# Patient Record
Sex: FEMALE | Race: WHITE | HISPANIC OR LATINO | Employment: FULL TIME | URBAN - METROPOLITAN AREA
[De-identification: names, ages, dates, MRNs, and addresses within clinical notes are randomized per-mention and may not be internally consistent; named-entity substitution may affect disease eponyms.]

---

## 2017-06-15 ENCOUNTER — HOSPITAL ENCOUNTER (OUTPATIENT)
Dept: LAB | Facility: MEDICAL CENTER | Age: 37
End: 2017-06-15
Attending: FAMILY MEDICINE

## 2017-06-15 LAB
ALBUMIN SERPL BCP-MCNC: 4.4 G/DL (ref 3.2–4.9)
ALBUMIN/GLOB SERPL: 1.2 G/DL
ALP SERPL-CCNC: 62 U/L (ref 30–99)
ALT SERPL-CCNC: 45 U/L (ref 2–50)
ANION GAP SERPL CALC-SCNC: 3 MMOL/L (ref 0–11.9)
AST SERPL-CCNC: 29 U/L (ref 12–45)
BILIRUB SERPL-MCNC: 0.6 MG/DL (ref 0.1–1.5)
BUN SERPL-MCNC: 13 MG/DL (ref 8–22)
CALCIUM SERPL-MCNC: 9.5 MG/DL (ref 8.5–10.5)
CHLORIDE SERPL-SCNC: 103 MMOL/L (ref 96–112)
CHOLEST SERPL-MCNC: 212 MG/DL (ref 100–199)
CO2 SERPL-SCNC: 33 MMOL/L (ref 20–33)
CREAT SERPL-MCNC: 0.68 MG/DL (ref 0.5–1.4)
EST. AVERAGE GLUCOSE BLD GHB EST-MCNC: 108 MG/DL
GFR SERPL CREATININE-BSD FRML MDRD: >60 ML/MIN/1.73 M 2
GLOBULIN SER CALC-MCNC: 3.8 G/DL (ref 1.9–3.5)
GLUCOSE SERPL-MCNC: 84 MG/DL (ref 65–99)
HBA1C MFR BLD: 5.4 % (ref 0–5.6)
HDLC SERPL-MCNC: 67 MG/DL
LDLC SERPL CALC-MCNC: 128 MG/DL
POTASSIUM SERPL-SCNC: 3.4 MMOL/L (ref 3.6–5.5)
PROT SERPL-MCNC: 8.2 G/DL (ref 6–8.2)
SODIUM SERPL-SCNC: 139 MMOL/L (ref 135–145)
TRIGL SERPL-MCNC: 87 MG/DL (ref 0–149)

## 2017-06-15 PROCEDURE — 80061 LIPID PANEL: CPT

## 2017-06-15 PROCEDURE — 36415 COLL VENOUS BLD VENIPUNCTURE: CPT

## 2017-06-15 PROCEDURE — 80053 COMPREHEN METABOLIC PANEL: CPT

## 2017-06-15 PROCEDURE — 83036 HEMOGLOBIN GLYCOSYLATED A1C: CPT

## 2017-08-29 ENCOUNTER — HOSPITAL ENCOUNTER (EMERGENCY)
Facility: MEDICAL CENTER | Age: 37
End: 2017-08-29
Attending: EMERGENCY MEDICINE

## 2017-08-29 VITALS
HEART RATE: 69 BPM | WEIGHT: 191 LBS | RESPIRATION RATE: 19 BRPM | DIASTOLIC BLOOD PRESSURE: 61 MMHG | TEMPERATURE: 97.6 F | SYSTOLIC BLOOD PRESSURE: 100 MMHG | HEIGHT: 64 IN | BODY MASS INDEX: 32.61 KG/M2 | OXYGEN SATURATION: 99 %

## 2017-08-29 DIAGNOSIS — R55 SYNCOPE, UNSPECIFIED SYNCOPE TYPE: ICD-10-CM

## 2017-08-29 LAB
ANION GAP SERPL CALC-SCNC: 10 MMOL/L (ref 0–11.9)
BASOPHILS # BLD AUTO: 0.4 % (ref 0–1.8)
BASOPHILS # BLD: 0.03 K/UL (ref 0–0.12)
BUN SERPL-MCNC: 10 MG/DL (ref 8–22)
CALCIUM SERPL-MCNC: 8.8 MG/DL (ref 8.5–10.5)
CHLORIDE SERPL-SCNC: 107 MMOL/L (ref 96–112)
CO2 SERPL-SCNC: 22 MMOL/L (ref 20–33)
CREAT SERPL-MCNC: 0.58 MG/DL (ref 0.5–1.4)
EOSINOPHIL # BLD AUTO: 0.07 K/UL (ref 0–0.51)
EOSINOPHIL NFR BLD: 1 % (ref 0–6.9)
ERYTHROCYTE [DISTWIDTH] IN BLOOD BY AUTOMATED COUNT: 47.8 FL (ref 35.9–50)
GFR SERPL CREATININE-BSD FRML MDRD: >60 ML/MIN/1.73 M 2
GLUCOSE SERPL-MCNC: 101 MG/DL (ref 65–99)
HCG SERPL QL: NEGATIVE
HCT VFR BLD AUTO: 35.7 % (ref 37–47)
HGB BLD-MCNC: 11.3 G/DL (ref 12–16)
IMM GRANULOCYTES # BLD AUTO: 0.03 K/UL (ref 0–0.11)
IMM GRANULOCYTES NFR BLD AUTO: 0.4 % (ref 0–0.9)
LYMPHOCYTES # BLD AUTO: 2.13 K/UL (ref 1–4.8)
LYMPHOCYTES NFR BLD: 28.9 % (ref 22–41)
MCH RBC QN AUTO: 26.1 PG (ref 27–33)
MCHC RBC AUTO-ENTMCNC: 31.7 G/DL (ref 33.6–35)
MCV RBC AUTO: 82.4 FL (ref 81.4–97.8)
MONOCYTES # BLD AUTO: 0.37 K/UL (ref 0–0.85)
MONOCYTES NFR BLD AUTO: 5 % (ref 0–13.4)
NEUTROPHILS # BLD AUTO: 4.73 K/UL (ref 2–7.15)
NEUTROPHILS NFR BLD: 64.3 % (ref 44–72)
NRBC # BLD AUTO: 0 K/UL
NRBC BLD AUTO-RTO: 0 /100 WBC
PLATELET # BLD AUTO: 269 K/UL (ref 164–446)
PMV BLD AUTO: 10.7 FL (ref 9–12.9)
POTASSIUM SERPL-SCNC: 3.4 MMOL/L (ref 3.6–5.5)
RBC # BLD AUTO: 4.33 M/UL (ref 4.2–5.4)
SODIUM SERPL-SCNC: 139 MMOL/L (ref 135–145)
TROPONIN I SERPL-MCNC: <0.01 NG/ML (ref 0–0.04)
WBC # BLD AUTO: 7.4 K/UL (ref 4.8–10.8)

## 2017-08-29 PROCEDURE — 85025 COMPLETE CBC W/AUTO DIFF WBC: CPT

## 2017-08-29 PROCEDURE — 84703 CHORIONIC GONADOTROPIN ASSAY: CPT

## 2017-08-29 PROCEDURE — 93005 ELECTROCARDIOGRAM TRACING: CPT | Performed by: EMERGENCY MEDICINE

## 2017-08-29 PROCEDURE — 700105 HCHG RX REV CODE 258: Performed by: EMERGENCY MEDICINE

## 2017-08-29 PROCEDURE — 700111 HCHG RX REV CODE 636 W/ 250 OVERRIDE (IP): Performed by: EMERGENCY MEDICINE

## 2017-08-29 PROCEDURE — 36415 COLL VENOUS BLD VENIPUNCTURE: CPT

## 2017-08-29 PROCEDURE — 84484 ASSAY OF TROPONIN QUANT: CPT

## 2017-08-29 PROCEDURE — 80048 BASIC METABOLIC PNL TOTAL CA: CPT

## 2017-08-29 PROCEDURE — 99285 EMERGENCY DEPT VISIT HI MDM: CPT

## 2017-08-29 PROCEDURE — 96374 THER/PROPH/DIAG INJ IV PUSH: CPT

## 2017-08-29 RX ORDER — SODIUM CHLORIDE 9 MG/ML
1000 INJECTION, SOLUTION INTRAVENOUS ONCE
Status: COMPLETED | OUTPATIENT
Start: 2017-08-29 | End: 2017-08-29

## 2017-08-29 RX ORDER — LORAZEPAM 2 MG/ML
1 INJECTION INTRAMUSCULAR ONCE
Status: COMPLETED | OUTPATIENT
Start: 2017-08-29 | End: 2017-08-29

## 2017-08-29 RX ADMIN — LORAZEPAM 1 MG: 2 INJECTION INTRAMUSCULAR; INTRAVENOUS at 05:44

## 2017-08-29 RX ADMIN — SODIUM CHLORIDE 1000 ML: 9 INJECTION, SOLUTION INTRAVENOUS at 04:58

## 2017-08-29 ASSESSMENT — PAIN SCALES - GENERAL: PAINLEVEL_OUTOF10: 2

## 2017-08-29 NOTE — DISCHARGE INSTRUCTIONS
Síncope  (Syncope)  Síncope es un término médico que significa desmayarse o perder la conciencia. Es cuando se pierde la conciencia y  al piso. Generalmente, la persona permanece inconsciente ashanti menos de 5 minutos. Puede tener algunas contracciones musculares ashanti un van de 15 segundos antes de despertar y volver a la normalidad. El síncope se presenta con mayor frecuencia en los adultos mayores, david puede ocurrir a cualquier edad. Aunque la mayoría de las causas no implican un peligro, el síncope puede ser un signo de un problema médico grave. Es importante buscar atención médica.   CAUSAS   La causa es alesia disminución súbita del flujo de maribel al cerebro. Generalmente la causa específica no puede determinarse. Los factores que pueden provocar el síncope son:  · El uso de medicamentos que disminuyen la presión arterial.  · Los cambios súbitos de postura, kaitlin por ejemplo al ponerse de pie rápidamente.  · Neelima más dosis de medicamento que lo recetado.  · Permanecer de pie en un lugar por mucho tiempo.  · Sufrir convulsiones.  · Deshidratación y exposición excesiva al calor.  · Bajo nivel de glucosa en maribel (hipoglucemia).  · Dificultad para defecar.  · Enfermedades cardíacas, latidos cardíacos irregulares u otros problemas circulatorios.  · Miedos, estrés emocional, visión de maribel o dolor intenso.  SÍNTOMAS   Inmediatamente antes de desmayarse podrá:  · Sentirse mareado o aturdido.  · Sentir náuseas.  · [place] todo horne o hugh en el yosef de la visión.  · Tener la piel fría y húmeda.  DIAGNÓSTICO   El médico le preguntará acerca de lanette síntomas, le realizará un examen físico y un electrocardiograma (ECG) para registrar la actividad eléctrica del corazón. También podrá indicarle otras pruebas cardíacas o análisis de maribel para determinar las causas del síncope, por ejemplo:  · Ecocardiograma transtorácico (ETT). Ashanti el ecocardiograma, se usan ondas sonoras para evaluar el flujo de la maribel  a través del corazón.  · Ecocardiograma transesofágico (ETE).  · Monitoreo cardíaco. Permite que el médico controle la frecuencia y el ritmo cardíaco en tiempo real.  · Monitor Holter. Es un dispositivo portátil que registra los latidos cardíacos y ayuda a diagnosticar las arritmias cardíacas. Le permite al médico registrar la actividad cardíaca ashanti varios días, si es necesario.  · Pruebas de estrés por ejercicio o por medicamentos que aceleran los latidos cardíacos.  TRATAMIENTO   En la mayoría de los casos, no se necesita tratamiento. Según la causa del síncope, el médico podrá recomendarle que cambie o deje de annia algunos de lanette medicamentos.  INSTRUCCIONES PARA EL CUIDADO EN EL HOGAR  · Pídale a alguien que se quede con usted hasta que se sienta estable.  · No conduzca vehículos, no use maquinarias ni practique deportes hasta que el médico lo autorice.  · Cumpla con todas las visitas de control, según le indique young médico.  · Recuéstese inmediatamente si siente que va a desmayarse. Respire profundamente y de manera continua. Espere hasta que los síntomas hayan desaparecido.  · Sasha suficiente líquido para mantener la orina keaton o de color amarillo pálido.  · Si está tomando medicamentos para la presión arterial o para el corazón, póngase de pie lentamente, tómese algunos minutos para permanecer sentado y luego párese. [place] reduce los mareos.  SOLICITE ATENCIÓN MÉDICA DE INMEDIATO SI:   · Sufre un dolor intenso de glenda.  · Siente un dolor intenso inusual en el pecho, el abdomen, o la espalda.  · Tiene un sangrado por la boca o el recto, o la materia fecal es de color hugh o aspecto alquitranado.  · Siente latidos irregulares o muy rápidos.  · Siente dolor al respirar.  · Sufre episodios de desmayo repetidos o temblores kaitlin sacudidas ashanti un episodio.  · Se desmaya mientras se encuentra sentado o acostado.  · Se siente confundido.  · Presenta dificultad para caminar.  · Siente debilidad  intensa.  · Tiene problemas de visión.  Si se desmaya, llame al servicio de emergencias de young localidad (911 en los [place]). No conduzca por lanette propios medios hasta el hospital.      Esta información no tiene kaitlin fin reemplazar el consejo del médico. Asegúrese de hacerle al médico cualquier pregunta que tenga.     Document Released: 09/27/2006 Document Revised: 05/03/2016  [employer] Interactive Patient Education ©2016 Elsevier Inc.    (Vasovagal Syncope, Adult)  El síncope, comúnmente conocido kaitlin desmayo, es alesia pérdida transitoria de la conciencia. Se produce cuando se reduce el flujo sanguíneo al cerebro. El síncope vasovagal (también llamado síncope neurocardiogénico) es un desmayo en el que el flujo de maribel al cerebro se reduce a causa de alesia caída repentina en la frecuencia cardíaca y la presión arterial. El síncope vasovagal se produce cuando el cerebro y el sistema cardiovascular (vasos sanguíneos) no se comunican ni responden el ruiz al otro de manera adecuada. Esta es la causa más común de desmayos. A menudo se produce kaitlin respuesta al miedo o algún otro tipo de estrés emocional o físico. El cuerpo tiene alesia reacción en la que el corazón comienza a latir muy lentamente o los vasos sanguíneos se expanden, para reducir la presión arterial. Isabelle tipo de desmayo se considera generalmente inofensivo. Sin embargo, pueden ocurrir lesiones si la persona tiene alesia caída repentina ashanti el desmayo.   CAUSAS   El síncope vasovagal se produce cuando la presión arterial y la frecuencia cardíaca de alesia persona disminuyen de repente, por lo general en respuesta a un factor desencadenante. Muchas cosas y situaciones pueden desencadenar un episodio. Entre ellas se incluyen:   · Dolor.    · Temor.    · [place] maribel o procedimientos médicos, kaitlin la maribel que se extrae de alesia vena.    · Las actividades comunes, tales kaitlin toser, estirarse, o ir al baño.    · Estrés emocional.    · Permanecer mucho tiempo de pie,  especialmente en un ambiente cálido.    · La falta de sueño o de descanso.    · Prolongada falta de alimentos.    · Prolongada falta de líquidos.    · Enfermedad reciente.  · El uso de ciertas drogas que afectan la presión arterial, kaitlin la cocaína, el alcohol, la marihuana, los inhalantes y los opiáceos.    SÍNTOMAS   Antes del episodio de desmayo, es posible que:   · Se sienta mareado o débil.    · Se vuelve pálido.  · Sienta que se va a desmayar.    · Siente kaitlin si la habitación diera vueltas.    · Tiene alesia visión de túnel, sólo ve lo que hay directamente frente a usted.    · Siente malestar estomacal (náuseas).    · Ve manchas o pierde poco a poco la visión.    · Escucha un zumbido en los oídos.    · Tiene dolor de glenda.  · Se siente afiebrado y sudoroso.    · Tiene sensación de hinchazón u hormigueo.  Ashanti el desmayo, por lo general, estará inconsciente ashanti no más de un par de minutos antes de despertar y volver a la normalidad. Si usted se levanta demasiado rápido antes de que young cuerpo pueda recuperarse, se va a desmayar otra vez. Ashanti el desmayo pueden producirse movimientos espasmódicos o bruscos.   DIAGNÓSTICO   El médico le preguntará sobre los síntomas y le hará alesia historia clínica y un examen físico. Se pueden hacer varios estudios para descartar otras causas de los desmayos. Estos pueden incluir análisis de maribel y pruebas para revisar el corazón, kaitlin un electrocardiograma ecocardiografía, y posiblemente, un estudio de electrofisiología. Cuando otras causas salomon sido descartadas, se puede realizar un examen para comprobar la respuesta del organismo a los cambios de posición (prueba de la sharif basculante).   TRATAMIENTO   La mayoría de los casos de síncope vasovagal no requieren tratamiento. Young médico le puede recomendar maneras de evitar los desencadenantes de desmayos y puede proporcionarle estrategias caseras para prevenir desmayos. Si tiene que estar expuesto a un posible  desencadenante, puede beber líquidos adicionales para ayudar a reducir cecily probabilidades de sufrir un episodio de síncope vasovagal. Si hay signos de advertencia de un episodio que se acerca, usted puede responder posicionándose favorablemente (acostarse).   Si los desmayos continúan, se le pueden tereso medicamentos para evitarlos. Algunos medicamentos pueden ayudar a hacerlo más resistente a episodios repetidos de síncope vasovagal. Se pueden recomendar ejercicios especiales o medias de compresión. En raros casos, se considera la colocación quirúrgica de un marcapasos.   INSTRUCCIONES PARA EL CUIDADO DOMICILIARIO   · Aprenda a identificar las señales del síncope vasovagal.    · Siéntese o acuéstese a la primera señal de un desmayo. Si se sienta, ponga young glenda entre las piernas. Si usted se acuesta, coloque las piernas hacia arriba para aumentar el flujo de maribel al cerebro.    · Evite los rivas calientes y los saunas.  · Evite estar mucho tiempo de pie.  · Debe ingerir gran cantidad de líquido para mantener la orina de nel lonnie o color amarillo pálido. Evite la cafeína.  · Aumente la sal en young dieta siguiendo las indicaciones de young médico.    · Si tiene que estar parado ashanti mucho tiempo, realice movimientos tales kaitlin:    ¨ Cruzar las piernas.    ¨ Flexionar y estirar los músculos de las piernas.    ¨ Ponerse en cuclillas.    ¨  las piernas.    ¨ Doblarse.    · [place] sólo medicamentos de venta marco o recetados, según las indicaciones del médico. No deje de annia ningún medicamento sin consultar con young médico gee.   SOLICITE ATENCIÓN MÉDICA SI:   · Cecily desmayos continúan o suceden con mayor frecuencia a pesar del tratamiento.    · Pierde el conocimiento ashanti más de un par de minutos.  · Se ha desmayado ashanti o después de hacer ejercicio o si se sorprende.    · Usted tiene nuevos síntomas que se producen con los desmayos, tales kaitlin:    ¨ Falta de aire.  ¨ Dolor en el pecho.    ¨ Latidos  cardíacos irregulares.    · Usted tiene episodios de espasmos o movimientos bruscos que yip más de unos pocos segundos.  · Usted tiene episodios de espasmos o movimientos bruscos sin desmayos.  BUSQUE ATENCIÓN MÉDICA DE INMEDIATO SI:   · Usted tiene lesiones o hemorragia después de un desmayo.    · Usted tiene episodios de espasmos o movimientos bruscos que yip más de 5 minutos.    · Usted tiene más de un episodio de movimientos espasmódicos antes de recuperar la consciencia después de un desmayo.     Esta información no tiene kaitlin fin reemplazar el consejo del médico. Asegúrese de hacerle al médico cualquier pregunta que tenga.     Document Released: 04/14/2014 Document Revised: 05/03/2016  Elsevier Interactive Patient Education ©2016 Elsevier Inc.

## 2017-08-29 NOTE — ED NOTES
Reviewed discharge instructions, pt verbalized understanding of instructions. States she will schedule follow-up appointment as needed. Denies further questions at this time. Pt ambulatory out of ER with stable gait.

## 2017-08-29 NOTE — DISCHARGE PLANNING
Medical Social Work    Referral: [facility]    Intervention: MSW responded to [facility] in [place] where pt had a syncopal episode while with her daughter who is giving birth.  Pt is Nicolette Foster (: 1980).  Pt's daughter is Sabrina Foster ([phone]) who is currently in Labor and Delivery.  Pt's other emergency contact is Kandy Silva ([phone]).  Pt taken to [facility] via nigel.      Plan: SW will follow as needed.

## 2017-08-29 NOTE — ED PROVIDER NOTES
"CHIEF COMPLAINT  Chief Complaint   Patient presents with   • Syncope     ALICE manning from floor where pt had syncopal episode while using restroom       HPI  Nicolette Foster is a 37 y.o. female With no significant medical history who presents after a syncopal episode while using the restroom today and having a bowel movement. This patient was actually brought into the emergency department after responded to a code blue call on the labor and delivery floor. The patient was found responsive though lethargic on the floor next to the toilet.    She was speaking at the time. There was no witnessed seizure activity. Patient denies prior syncopal episode in the past. Denies chest pain or shortness of breath. No nausea or vomiting. The patient was there while her daughter was delivering a child and in active labor in the next room.    Glucose was normal at 96.  The patient was hypotensive and bradycardic. EKG was performed on the floor. Patient was then brought to the emergency department for further evaluation.    REVIEW OF SYSTEMS  See HPI for further details. All other systems are negative.     PAST MEDICAL HISTORY       SOCIAL HISTORY  Social History     Social History Main Topics   • Smoking status: Never Smoker   • Smokeless tobacco: Never Used   • Alcohol use No   • Drug use: No   • Sexual activity: Not on file       SURGICAL HISTORY  patient denies any surgical history    CURRENT MEDICATIONS  Home Medications     Reviewed by Mando Wilkerson RADRIANA (Registered Nurse) on 08/29/17 at 0443  Med List Status: Partial   Medication Last Dose Status   NON SPECIFIED  Active                ALLERGIES  No Known Allergies    PHYSICAL EXAM  VITAL SIGNS: /61   Pulse 64   Temp 36.4 °C (97.5 °F)   Resp (!) 22   Ht 1.626 m (5' 4\")   Wt 86.6 kg (191 lb)   LMP 08/27/2017 (Exact Date)   SpO2 100%   BMI 32.79 kg/m²   Pulse ox interpretation: I interpret this pulse ox as normal.  Constitutional: Lethargic in no apparent " distress.  HENT: Bilateral external ears normal, Nose normal.   Eyes: Pupils are equal and reactive, Conjunctiva normal, Non-icteric. Small abrasion overlying her left eyelid however no other signs of trauma  Neck: Normal range of motion, No tenderness, Supple, No stridor.   Cardiovascular: Regular rate and rhythm, no murmurs.   Thorax & Lungs: Normal breath sounds, No respiratory distress, No wheezing, No chest tenderness.   Abdomen: Bowel sounds normal, Soft, No tenderness, No masses, No pulsatile masses. No peritoneal signs.  Skin: Warm, Dry, No erythema, No rash.   Back: No bony tenderness, No CVA tenderness.   Extremities: Intact distal pulses, No edema, No tenderness, No cyanosis  Musculoskeletal: Good range of motion in all major joints. No tenderness to palpation or major deformities noted.   Neurologic: Lethargic, Normal motor function and gait, Normal sensory function, No focal deficits noted.   Psychiatric: Affect normal, Judgment normal, Mood normal.       DIAGNOSTIC STUDIES / PROCEDURES    EKG  8/29/2017 at 4:22 AM  Sinus bradycardia at 54  WY, QRS, QTC within normal limits  No ST elevations or depressions  Normal axis    LABS  Labs Reviewed   CBC WITH DIFFERENTIAL - Abnormal; Notable for the following:        Result Value    Hemoglobin 11.3 (*)     Hematocrit 35.7 (*)     MCH 26.1 (*)     MCHC 31.7 (*)     All other components within normal limits   BASIC METABOLIC PANEL - Abnormal; Notable for the following:     Potassium 3.4 (*)     Glucose 101 (*)     All other components within normal limits   HCG QUAL SERUM   TROPONIN   ESTIMATED GFR       RADIOLOGY  No orders to display       COURSE & MEDICAL DECISION MAKING  Pertinent Labs & Imaging studies reviewed. (See chart for details)  37 y.o. Female presenting after a syncopal episode following a bowel movement while her daughter was delivering a baby on the labor and delivery floor. I responded to her the patient had the syncopal episode in this  hospital. She was found to be lethargic with bradycardia and hypotension. No prior cardiac history. No chest pain or shortness of breath. Patient's symptoms most consistent with vasovagal syncope. Given her initial hypotension, IV fluid hydration was initiated. She had normal glucose on scene. Laboratory studies were largely unremarkable. EKG was unremarkable as well, without signs of ischemia or arrhythmia.    Patient was reevaluated at bedside and continues to be stable. No significant medical complaints at time of discharge.  No further indication for inpatient management or further workup at this time. To follow-up with primary care physician for further management however.    The patient will return for worsening symptoms or failure of improvement and is stable at the time of discharge. The patient verbalizes understanding in their own words.    Pcp Pt States None    In 2 days      [facility], Emergency Dept  [address] [place] [id number] [phone]    As needed, If symptoms worsen      FINAL IMPRESSION  1. Syncope, unspecified syncope type            Electronically signed by: Juan Ovalles 8/29/2017 4:37 AM

## 2017-08-29 NOTE — ED NOTES
Chief Complaint   Patient presents with   • Syncope     BIB gurney from floor where pt had syncopal episode while using restroom     Pt to  4 with above. ERP at bedside on arrival to .   Pt A&Ox4, GCS 15.   Small abrasion noted to LT eyelid. Pt also complaining of some pain to her RT ankle. deneis pain anywhere else, denies head/neck pain.   Pt aware of POC. VSS on RA.

## 2018-07-21 PROCEDURE — 99283 EMERGENCY DEPT VISIT LOW MDM: CPT

## 2018-07-21 ASSESSMENT — PAIN SCALES - GENERAL: PAINLEVEL_OUTOF10: 3

## 2018-07-22 ENCOUNTER — HOSPITAL ENCOUNTER (EMERGENCY)
Facility: MEDICAL CENTER | Age: 38
End: 2018-07-22
Attending: EMERGENCY MEDICINE

## 2018-07-22 VITALS
RESPIRATION RATE: 14 BRPM | TEMPERATURE: 97.5 F | WEIGHT: 208.34 LBS | HEIGHT: 64 IN | HEART RATE: 83 BPM | OXYGEN SATURATION: 99 % | BODY MASS INDEX: 35.57 KG/M2 | DIASTOLIC BLOOD PRESSURE: 99 MMHG | SYSTOLIC BLOOD PRESSURE: 138 MMHG

## 2018-07-22 DIAGNOSIS — H10.31 ACUTE BACTERIAL CONJUNCTIVITIS OF RIGHT EYE: ICD-10-CM

## 2018-07-22 RX ORDER — GENTAMICIN SULFATE 3 MG/ML
1 SOLUTION/ DROPS OPHTHALMIC EVERY 4 HOURS
Qty: 1 BOTTLE | Refills: 0 | Status: SHIPPED | OUTPATIENT
Start: 2018-07-22 | End: 2018-07-29

## 2018-07-22 NOTE — ED PROVIDER NOTES
"ED Provider Note    CHIEF COMPLAINT  Chief Complaint   Patient presents with   • Eye Swelling     pt. states that her right eyelids began to swell since this morning progressively getting worse. Pt. states she took some \"pink eye\" drops and tetramyacin to help and her eyelids have continued to swell. Pt. has purulent drainage coming from that eye.        HPI  Nicolette Tariq is a 38 y.o. female who presents to the emergency department with chief complaint of right eye discharge.  The patient states she has had a cough and a sore throat for the last few days but today this morning she woke up with discharge coming out of the right eye and a little bit of swelling.  She bought some eye relieving eyedrops and tetramycin drops had a [place] earlier today.  She states that she has a little bit of blurry vision on the right eye due to the swelling and the increased discharge but does not have severe pain and does not feel like anything is in there.  She is otherwise healthy besides a history of hypertension or diabetes but otherwise is feeling well    REVIEW OF SYSTEMS  Positives as above. Pertinent negatives include headaches difficulty swallowing difficulty breathing  All other review of systems are negative    PAST MEDICAL HISTORY   has a past medical history of Hypertension.    SOCIAL HISTORY  Social History     Social History Main Topics   • Smoking status: Never Smoker   • Smokeless tobacco: Never Used   • Alcohol use No   • Drug use: No   • Sexual activity: Not on file       SURGICAL HISTORY  patient denies any surgical history    CURRENT MEDICATIONS  Home Medications    **Home medications have not yet been reviewed for this encounter**         ALLERGIES  No Known Allergies    PHYSICAL EXAM  VITAL SIGNS: /78   Pulse 84   Temp 36.6 °C (97.9 °F) (Temporal)   Resp 16   Ht 1.626 m (5' 4\")   Wt 94.5 kg (208 lb 5.4 oz)   SpO2 98%   BMI 35.76 kg/m²    Pulse ox interpretation: I interpret this " "pulse ox as normal.  Constitutional: Alert in no apparent distress.  HENT: Normocephalic, Atraumatic, MMM, mildly erythematous oropharynx uvula midline no trismus  Eyes: PERound.  Left conjunctival within normal limits, right conjunctive extremely injected with thick yellow discharge mild swelling of the upper and lower lids no induration no warmth non-icteric.   Heart: Regular rate and rythm, no murmurs.    Lungs: Clear to auscultation bilaterally. No resp distress, breath sounds equal  Abdomen: Non-tender, non-distended, normal bowel sounds  Skin: Warm, Dry, No erythema, No rash.   Neurologic: Alert and oriented, Grossly non-focal.       Row Name  07/22/18  0100        Eye Procedures     OS (Left Eye)  20 40         PP       OD (Right Eye)  2050         PP       OU (Both Eyes)  2025           DIFFERENTIAL DIAGNOSIS AND WORK UP PLAN    This is a 38 y.o. female who presents with signs and symptoms consistent with a conjunctivitis, the sclerae very erythematous with a thick discharge, visual acuity only has a mild diminished vision on the right side mostly secondary to the fact she had keep holding her eyelid open.  I do not see any signs of preseptal cellulitis and she has no pain with extraocular movements that would indicate orbital cellulitis.  Patient however was given very strict return precautions within the next 1-2 days for worsening swelling of the face or redness or worsening pain with movement of the eye changes or vision.  She understands and feels comfortable going home    /99   Pulse 83   Temp 36.4 °C (97.5 °F)   Resp 14   Ht 1.626 m (5' 4\")   Wt 94.5 kg (208 lb 5.4 oz)   SpO2 99%   BMI 35.76 kg/m²         The patient will return for new or worsening symptoms and is stable at the time of discharge.    The patient is referred to a primary physician for blood pressure management, diabetic screening, and for all other preventative health concerns.    DISPOSITION:  Patient will be discharged " home in stable condition.    FOLLOW UP:  [facility], Emergency Dept  [address]  [place] [address] [phone]    If symptoms worsen      OUTPATIENT MEDICATIONS:  New Prescriptions    GENTAMICIN (GARAMYCIN) 0.3 % SOLUTION    Place 1 Drop in right eye every 4 hours for 7 days.           FINAL IMPRESSION  1. Acute bacterial conjunctivitis of right eye                 Electronically signed by: Mely Canada, 7/22/2018 1:09 AM    This dictation has been created using voice recognition software and/or scribes. The accuracy of the dictation is limited by the abilities of the software and the expertise of the scribes. I expect there may be some errors of grammar and possibly content. I made every attempt to manually correct the errors within my dictation. However, errors related to voice recognition software and/or scribes may still exist and should be interpreted within the appropriate context.

## 2018-07-22 NOTE — ED TRIAGE NOTES
"Nicolette Nunez Foster Marciano  38 y.o. Female  Chief Complaint   Patient presents with   • Eye Swelling     pt. states that her right eyelids began to swell since this morning progressively getting worse. Pt. states she took some \"pink eye\" drops and tetramyacin to help and her eyelids have continued to swell. Pt. has purulent drainage coming from that eye.        /78   Pulse 84   Temp 36.6 °C (97.9 °F) (Temporal)   Resp 16   Ht 1.626 m (5' 4\")   Wt 94.5 kg (208 lb 5.4 oz)   SpO2 98%   BMI 35.76 kg/m²     Pt amb to triage with steady gait for above complaint. Pt. Is PERRLA.  Pt is alert and oriented, speaking in full sentences, follows commands and responds appropriately to questions. NAD. Resp are even and unlabored.  Pt placed in lobby. Pt educated on triage process. Pt encouraged to alert staff for any changes.  "

## 2018-07-22 NOTE — DISCHARGE INSTRUCTIONS
Conjuntivitis bacteriana  (Bacterial Conjunctivitis)  La conjuntivitis bacteriana es alesia infección de la membrana transparente que cubre la parte drew del dong y la jennifer interna del párpado (conjuntiva). Cuando los vasos sanguíneos de la conjuntiva se inflaman, el dong se pone perez o olivier, y es posible que le pique. La conjuntivitis bacteriana se transmite fácilmente de alesia persona a la otra (es contagiosa). También se contagia fácilmente de un dong al otro.  CAUSAS  La causa de esta afección son varias bacterias comunes. Puede contraer la infección si entra en contacto con otra persona que está infectada. También puede entrar en contacto con elementos que estén contaminados con la bacteria, kaitlin alesia toalla para la jennifer, solución para lentes de contacto o maquillaje para ojos.  FACTORES DE RIESGO  Es más probable que esta afección se manifieste en las personas que:  · Mantienen contacto físico con personas que tienen la infección.  · Usan lentes de contacto.  · Tienen sinusitis.  · Dawson tenido alesia lesión o cirugía reciente en el dong.  · Tiene debilitado el sistema de defensa del organismo (sistema inmunitario).  · Tienen alesia afección médica que causa sequedad en los ojos.  SÍNTOMAS  Los síntomas de esta afección incluyen lo siguiente:  · Ojos rojos.  · Lagrimeo u ojos llorosos.  · Picazón en los ojos.  · Sensación de ardor en los ojos.  · Secreción espesa y amarillenta del dong. Esta secreción puede convertirse en alesia costra en el párpado ashanti la noche, que hace que los párpados se peguen.  · Hinchazón de los párpados.  · Visión borrosa.  DIAGNÓSTICO  El médico puede diagnosticar esta afección en función de los síntomas y los antecedentes médicos. El médico también puede obtener alesia muestra de la secreción del dong para averiguar la causa de la infección. [place] no se hace con frecuencia.  TRATAMIENTO  El tratamiento de esta afección incluye lo siguiente:  · Gotas o ungüento para los ojos con antibiótico para erradicar  la infección con más rapidez y evitar el contagio a otras personas.  · Antibióticos por vía oral para tratar infecciones que no responden a las gotas o los ungüentos, o que yip más de 10 días.  · Paños húmedos fríos (compresas frías) sobre los ojos.  · Lágrimas artificiales aplicadas 2 a 6 veces por día.  INSTRUCCIONES PARA EL CUIDADO EN EL HOGAR  Medicamentos   · Coronita los antibióticos o aplíqueselos kaitlin se lo haya indicado el médico. No deje de annia los antibióticos o de aplicárselos aunque comience a sentirse mejor.  · Coronita o aplíquese los medicamentos de venta marco y recetados solamente kaitlin se lo haya indicado el médico.  · Tenga mucho cuidado de no tocar el borde del párpado con el frasco de las gotas para los ojos o el tubo del ungüento cuando aplica los medicamentos en el dong afectado. [place] evitará que se contagie la infección al otro dong o a otras personas.  Control de las molestias   · Retire suavemente la secreción de los ojos con un paño tibio y húmedo o con alesia torunda de algodón.  · Aplíquese un paño frío y limpio en el dong ashanti 10 a 20 minutos, 3 a 4 veces al día.  Instrucciones generales   · No use lentes de contacto hasta que haya desaparecido la inflamación y young médico le indique que es seguro usarlos nuevamente. [place]regúntele al médico cómo esterilizar o reemplazar lanette lentes de contacto antes de usarlos nuevamente. Use anteojos hasta que pueda volver a usar los lentes de contacto.  · Evite usar maquillaje en los ojos hasta que la inflamación se haya sae. Descarte cosméticos viejos para los ojos que puedan estar contaminados.  · Cambie o lave young almohada todos los días.  · No comparta las toallas o los paños. [place] puede propagar la infección.  · Lave lanette kasie frecuentemente con agua y jabón. Use toallas de papel para secarse las kasie.  · Evite tocarse o frotarse los ojos.  · No conduzca ni use maquinaria pesada si young visión es borrosa.  SOLICITE ATENCIÓN MÉDICA SI:  · Tiene fiebre.  · Los  síntomas no mejoran después de 10 días de tratamiento.  SOLICITE ATENCIÓN MÉDICA DE INMEDIATO SI:  · Tiene fiebre y los síntomas empeoran repentinamente.  · Siente dolor intenso cuando mueve el dong.  · Siente dolor u observa hinchazón o enrojecimiento en la jennifer.  · Pierde la visión repentinamente.  Esta información no tiene kaitlin fin reemplazar el consejo del médico. Asegúrese de hacerle al médico cualquier pregunta que tenga.  Document Released: 09/27/2006 Document Revised: 04/10/2017 Document Reviewed: 09/29/2016  [employer] Interactive Patient Education © 2017 [employer]

## 2024-05-25 ENCOUNTER — HOSPITAL ENCOUNTER (OUTPATIENT)
Facility: MEDICAL CENTER | Age: 44
End: 2024-05-25
Attending: PATHOLOGY
Payer: COMMERCIAL

## 2024-05-26 LAB
ERYTHROCYTE [DISTWIDTH] IN BLOOD BY AUTOMATED COUNT: 45.4 FL (ref 35.9–50)
HCT VFR BLD AUTO: 42 % (ref 37–47)
HGB BLD-MCNC: 13.7 G/DL (ref 12–16)
MCH RBC QN AUTO: 27.8 PG (ref 27–33)
MCHC RBC AUTO-ENTMCNC: 32.6 G/DL (ref 32.2–35.5)
MCV RBC AUTO: 85.4 FL (ref 81.4–97.8)
PLATELET # BLD AUTO: 328 K/UL (ref 164–446)
PMV BLD AUTO: 11.8 FL (ref 9–12.9)
RBC # BLD AUTO: 4.92 M/UL (ref 4.2–5.4)
WBC # BLD AUTO: 7.4 K/UL (ref 4.8–10.8)